# Patient Record
(demographics unavailable — no encounter records)

---

## 2024-12-18 NOTE — REVIEW OF SYSTEMS
[Back Pain] : back pain [Muscle Pain] : no muscle pain [Headache] : no headache [Dizziness] : no dizziness

## 2024-12-18 NOTE — PHYSICAL EXAM
[No Acute Distress] : no acute distress [Well-Appearing] : well-appearing [No Spinal Tenderness] : no spinal tenderness [Coordination Grossly Intact] : coordination grossly intact [No Focal Deficits] : no focal deficits [Normal Gait] : normal gait [Normal Affect] : the affect was normal [Normal Insight/Judgement] : insight and judgment were intact
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
## 2024-12-18 NOTE — ASSESSMENT
[FreeTextEntry1] : #Neck pain/fracture - Presenting with abnormal x-rays from chiropractor Dr. Benson - Possible fracture noted in cervical and thoracic spine, need MRI for further clarification - Patient not in significant pain today, no radiation of pain, numbness or tingling - Ordered MRIs

## 2025-07-14 NOTE — ASSESSMENT
[FreeTextEntry1] : #Chest discomfort  - Presenting to the office complaining of chest discomfort for the past week - Occurred after he played golf - ECG today shows sinus bradycardia, rate 51 without significant changes compared to previous ECG - however there is an inverted T wave in V1 which was present on a prior ECG but not the most recent ECG  - Suspect noncardiac etiology at this time, however, will check cardiac enzymes and labs today  #Fatigue - Presenting to the office with concerns of fatigue - Etiology unclear - Will check blood panel to exclude metabolic abnormalities, vitamin deficiencies, thyroid dysfunction, tick borne illness as cause of fatigue  #Skin cancer screening - Requests derm referral for skin check

## 2025-07-14 NOTE — HISTORY OF PRESENT ILLNESS
[FreeTextEntry8] : 69yo male presenting to the office complaining of pulled muscle.  Reports on 7/6, he felt a sensation of left chest discomfort and fluttering sensation while he was playing golf. Reports he had a big swing and thinks he may have pulled a muscle.  Reports since then, has felt intermittent left chest wall discomfort. Played golf again and over the past three days, the chest pain has occurred more consistently. Has been using a heating pad which did not really help the pain.  Reports a little shortness of breath when walking up a flight of stairs.   He endorses feeling fatigued.  Reports he has pulled off ticks from his dogs and wants to be checked for Lyme.

## 2025-07-14 NOTE — REVIEW OF SYSTEMS
[Fatigue] : fatigue [Chest Pain] : chest pain [Palpitations] : palpitations [Dyspnea on Exertion] : dyspnea on exertion [Fever] : no fever [Chills] : no chills [Shortness Of Breath] : no shortness of breath [Headache] : no headache [Dizziness] : no dizziness